# Patient Record
Sex: FEMALE | Race: BLACK OR AFRICAN AMERICAN | Employment: FULL TIME | URBAN - METROPOLITAN AREA
[De-identification: names, ages, dates, MRNs, and addresses within clinical notes are randomized per-mention and may not be internally consistent; named-entity substitution may affect disease eponyms.]

---

## 2018-05-09 ENCOUNTER — HOSPITAL ENCOUNTER (EMERGENCY)
Age: 48
Discharge: HOME | End: 2018-05-09
Attending: EMERGENCY MEDICINE
Payer: SELF-PAY

## 2018-05-09 VITALS
RESPIRATION RATE: 16 BRPM | OXYGEN SATURATION: 100 % | HEART RATE: 87 BPM | DIASTOLIC BLOOD PRESSURE: 74 MMHG | SYSTOLIC BLOOD PRESSURE: 127 MMHG | TEMPERATURE: 98.6 F

## 2018-05-09 DIAGNOSIS — J04.0 LARYNGITIS: Primary | ICD-10-CM

## 2018-05-09 PROCEDURE — 99283 EMERGENCY DEPT VISIT LOW MDM: CPT

## 2018-05-09 PROCEDURE — 74011250637 HC RX REV CODE- 250/637: Performed by: EMERGENCY MEDICINE

## 2018-05-09 PROCEDURE — 87077 CULTURE AEROBIC IDENTIFY: CPT

## 2018-05-09 PROCEDURE — 87880 STREP A ASSAY W/OPTIC: CPT

## 2018-05-09 RX ORDER — DEXAMETHASONE SODIUM PHOSPHATE 4 MG/ML
10 INJECTION, SOLUTION INTRA-ARTICULAR; INTRALESIONAL; INTRAMUSCULAR; INTRAVENOUS; SOFT TISSUE ONCE
Status: COMPLETED | OUTPATIENT
Start: 2018-05-09 | End: 2018-05-09

## 2018-05-09 RX ADMIN — DEXAMETHASONE SODIUM PHOSPHATE 10 MG: 4 INJECTION, SOLUTION INTRAMUSCULAR; INTRAVENOUS at 09:05

## 2018-05-09 NOTE — ED PROVIDER NOTES
EMERGENCY DEPARTMENT HISTORY AND PHYSICAL EXAM    7:13 AM      Date: 5/9/2018  Patient Name: Neftali Patel    History of Presenting Illness     Chief Complaint   Patient presents with    Laryngitis         History Provided By: Patient    Chief Complaint: Voice chage  Duration: 2 Days  Timing:  Acute and Progressive  Location: Throat  Quality: \"like I'm straining my voice, it's gone\"  Severity: Moderate  Modifying Factors: none reported  Associated Symptoms: postnasal drip, congestion      Additional History (Context): Neftali Patel is a 57 y.o. female with No significant past medical history who presents with progressive voice loss x2 days. Associated sx include congestion, postnasal drip. Reports not smoking for several months and had a neighbor's cigarette prior to onset of symptoms. Pt denies fever, CP, SOB, hx prior. Pt is not in a job requiring a lot of vocal strain. No hx tobacco use, occasional etOH. PCP: None        Past History     Past Medical History:  History reviewed. No pertinent past medical history. Past Surgical History:  History reviewed. No pertinent surgical history. Family History:  History reviewed. No pertinent family history. Social History:  Social History   Substance Use Topics    Smoking status: Former Smoker    Smokeless tobacco: Never Used    Alcohol use No       Allergies:  No Known Allergies      Review of Systems       Review of Systems   Constitutional: Negative for chills. HENT: Positive for congestion, postnasal drip and voice change. Negative for sore throat. Respiratory: Negative for cough and shortness of breath. Cardiovascular: Negative for chest pain. Gastrointestinal: Negative for abdominal pain, diarrhea, nausea and vomiting. Genitourinary: Negative for dysuria. Musculoskeletal: Negative for back pain. Skin: Negative for rash. Neurological: Negative for dizziness and headaches.    All other systems reviewed and are negative. Physical Exam     Visit Vitals    /74    Pulse 87    Temp 98.6 °F (37 °C)    Resp 16    SpO2 100%         Physical Exam  General Exam: Patient is a well developed and well nourished in no distress. Patient does not appear acutely ill or toxic. Eye Exam: Lids and conjunctiva are normal  ENT Exam: The general head and facial exam is normal.  The neck is supple without meningeal signs. Mild L anterior cervical lymphadenopathy. No tonsillar swelling or exudate, no oral abscess. Pulmonary Exam: No respiratory distress. The respiratory rate is normal.  No stridor. The breath sounds are equal bilaterally. There are no wheezes, rales, or rhonchi noted. Cardiac Exam: The cardiac rate and rhythm are normal.  No significant murmurs, rubs, or gallops. The peripheral pulses are normal.  Abdominal Exam: Abdomen is soft and non-distended. No pulsatile masses. There is no local tenderness. There is no rebound or guarding noted. Skin and Soft Tissue: The skin is warm and dry, without significant abnormality. Good color. Musculoskeletal Exam: No peripheral edema. The musculoskeletal exam of the lower extremities is normal without significant local tenderness. Psychiatric: Normal adult with appropriate demeanor and interpersonal interaction. Is oriented to person, place, and time. Diagnostic Study Results     Labs -  Recent Results (from the past 12 hour(s))   STREP THROAT SCREEN    Collection Time: 05/09/18  7:45 AM   Result Value Ref Range    Special Requests: NO SPECIAL REQUESTS      Strep Screen NEGATIVE       Culture result: PENDING            Medical Decision Making   I am the first provider for this patient. I reviewed the vital signs, available nursing notes, past medical history, past surgical history, family history and social history. Vital Signs-Reviewed the patient's vital signs.     Pulse Oximetry Analysis -  100% on room air (Interpretation) nonhypoxic    Cardiac Monitor:  Rate: 87        Records Reviewed: Nursing Notes (Time of Review: 7:13 AM)    ED Course: Progress Notes, Reevaluation, and Consults:      Provider Notes (Medical Decision Making): Pt with sore throat, loss of voice occurring after smoking cigarette. No signs of airway compromise. No oral/facial swelling. Vitals normal. Strep negative. Pt controlling secretions, drinking warm fluids without issue. Will give dose of decadron to help with inflammation. Doubt need for labs or imaging at this time given pt's well appearance. Pt aware of treatment plan and to return to ER with any worsening symptoms. Encougared to avoid tobacco.      Diagnosis     Clinical Impression:   1. Laryngitis        Disposition: Discharge     Follow-up Information     Follow up With Details Comments Contact [facility] EMERGENCY DEPT  If symptoms worsen: trouble swallowing, swelling in mouth [address]  [phone]           Patient's Medications    No medications on file     _______________________________    Attestations:  Scribe [address] [place] [address] acting as a scribe for and in the presence of Cheko Phillips MD      May 09, 2018 at 8:44 AM       Provider Attestation:      I personally performed the services described in the documentation, reviewed the documentation, as recorded by the scribe in my presence, and it accurately and completely records my words and actions.  May 09, 2018 at 8:44 AM - Cheko Phillips MD    _______________________________

## 2018-05-09 NOTE — LETTER
[facility] EMERGENCY DEPT 
[address] [facility] [id number] 
[phone] Work/School Note Date: 5/9/2018 To Whom It May concern: 
 
Olya Davalos was seen and treated today in the emergency room by the following provider(s): 
Attending Provider: Anny Lazo MD. Olya Davalos may return to work on 05/10/18. Sincerely, Anny Lazo MD

## 2018-05-11 LAB
B-HEM STREP THROAT QL CULT: NEGATIVE
BACTERIA SPEC CULT: ABNORMAL
BACTERIA SPEC CULT: ABNORMAL
SERVICE CMNT-IMP: ABNORMAL

## 2020-07-27 ENCOUNTER — VIRTUAL VISIT (OUTPATIENT)
Dept: FAMILY MEDICINE CLINIC | Age: 50
End: 2020-07-27

## 2020-07-27 DIAGNOSIS — Z13.220 SCREENING, LIPID: ICD-10-CM

## 2020-07-27 DIAGNOSIS — D50.0 IRON DEFICIENCY ANEMIA DUE TO CHRONIC BLOOD LOSS: ICD-10-CM

## 2020-07-27 DIAGNOSIS — Z12.31 SCREENING MAMMOGRAM, ENCOUNTER FOR: ICD-10-CM

## 2020-07-27 DIAGNOSIS — N93.8 DUB (DYSFUNCTIONAL UTERINE BLEEDING): ICD-10-CM

## 2020-07-27 DIAGNOSIS — N93.8 DUB (DYSFUNCTIONAL UTERINE BLEEDING): Primary | ICD-10-CM

## 2020-07-27 DIAGNOSIS — Z76.89 ENCOUNTER TO ESTABLISH CARE: ICD-10-CM

## 2020-07-27 RX ORDER — LANOLIN ALCOHOL/MO/W.PET/CERES
325 CREAM (GRAM) TOPICAL
COMMUNITY

## 2020-07-27 NOTE — PROGRESS NOTES
Chief Complaint   Patient presents with   [address] Patient   [facility] Follow Up     Reginia Certain 6/14/20       1. Have you been to the ER, urgent care clinic since your last visit? Hospitalized since your last visit? [facility] Hemorrhage 6/14-6-18/20    2. Have you seen or consulted any other health care providers outside of the [address] [place] [address] since your last visit? Include any pap smears or colon screening.  Obgyn

## 2020-07-27 NOTE — PROGRESS NOTES
Jose R               [address]               [phone]      Sandy Merchant is a 52 y.o. female who was seen by synchronous (real-time) audio-video technology on 7/27/2020. Consent: Carole Ireland, who was seen by synchronous (real-time) audio-video technology, and/or her healthcare decision maker, is aware that this patient-initiated, Telehealth encounter on 7/27/2020 is a billable service, with coverage as determined by her insurance carrier. She is aware that she may receive a bill and has provided verbal consent to proceed: Yes. Assessment & Plan:   Diagnoses and all orders for this visit:    1. DUB (dysfunctional uterine bleeding)  -     CBC W/O DIFF; Future  -     METABOLIC PANEL, COMPREHENSIVE; Future  -     TSH 3RD GENERATION; Future  -     T4, FREE; Future  Hospitalized for DUB of unknown cause  Hgb as low as 3.5  Follow up labs and check thyroid as possible cauase  Has GYN visit on august 3rd     2. Iron deficiency anemia due to chronic blood loss  -     CBC W/O DIFF; Future  -     METABOLIC PANEL, COMPREHENSIVE; Future  Follow up labs for her anemia    3. Screening, lipid  -     LIPID PANEL; Future  Health maintenance: lipid screening    4. Screening mammogram, encounter for  -     ESVIN MAMMO BI SCREENING INCL CAD; Future  Health maintenance: mammogram ordered    5. Encounter to establish care  Does not have a previous PCP    Follow-up and Dispositions    · Return for ASAP, Labs, AND 1 year, CPE, 30 min, office only.              712  Subjective:   Sandy Merchant is a 52 y.o. female who was seen for   New Patient and Hospital Follow Up [facility] 6/14/20)    Works as CNA, in home health    Dysfunctional uterine bleeding  In hospital from 6/11-13/20  The cause of the bleeding is unknown  US was negative  Endorses: hot flashes at night, cold intolerance  Last H/H 6/13/20: 8.3/26.4  Lowest when hospitalized was 3.5  Has appointment with gyn  Freida on 8/3/2020  Is not taking iron daily  No longer on provera: menses has stopped around 7/13/20    No history of colon cancer in her family  No history of breast cancer in her family  No history of osteoporosis    Prior to Admission medications    Medication Sig Start Date End Date Taking? Authorizing Provider   ferrous sulfate 325 mg (65 mg iron) tablet Take 325 mg by mouth Daily (before breakfast). Yes Provider, Historical     No Known Allergies    Patient Active Problem List   Diagnosis Code    DUB (dysfunctional uterine bleeding) N93.8    Iron deficiency anemia due to chronic blood loss D50.0     Patient Active Problem List    Diagnosis Date Noted    Iron deficiency anemia due to chronic blood loss 06/12/2020    DUB (dysfunctional uterine bleeding) 06/11/2020       No Known Allergies  Past Medical History:   Diagnosis Date    Hemorrhage 2020    Blood Transfusion      History reviewed. No pertinent surgical history. Family History   Problem Relation Age of Onset    Hypertension Mother      Social History     Tobacco Use    Smoking status: Former Smoker     Packs/day: 1.00     Years: 15.00     Pack years: 15.00    Smokeless tobacco: Never Used   Substance Use Topics    Alcohol use: Yes       ROS  As stated in HPI, otherwise all others negative. Objective: There were no vitals taken for this visit. General: alert, cooperative, no distress   Mental  status: normal mood, behavior, speech, dress, motor activity, and thought processes, able to follow commands   HENT: NCAT   Neck: no visualized mass   Resp: no respiratory distress   Neuro: no gross deficits   Skin: no discoloration or lesions of concern on visible areas   Psychiatric: normal affect, consistent with stated mood, no evidence of hallucinations     Additional exam findings: We discussed the expected course, resolution and complications of the diagnosis(es) in detail.   Medication risks, benefits, costs, interactions, and alternatives were discussed as indicated. I advised her to contact the office if her condition worsens, changes or fails to improve as anticipated. She expressed understanding with the diagnosis(es) and plan. Clementina Flanagan is a 52 y.o. female who was evaluated by a video visit encounter for concerns as above. Patient identification was verified prior to start of the visit. A caregiver was present when appropriate. Due to this being a TeleHealth encounter (During [facility]-27 public health emergency), evaluation of the following organ systems was limited: Vitals/Constitutional/EENT/Resp/CV/GI//MS/Neuro/Skin/Heme-Lymph-Imm. Pursuant to the emergency declaration under the [facility]5 waiver authority and the [employer] Act, this Virtual  Visit was conducted, with patient's (and/or legal guardian's) consent, to reduce the patient's risk of exposure to COVID-19 and provide necessary medical care. Services were provided through a video synchronous discussion virtually to substitute for in-person clinic visit. Patient and provider were located at their individual homes. An After Visit Summary was printed and given to the patient. All diagnosis have been discussed with the patient and all of the patient's questions have been answered. Follow-up and Dispositions    · Return for ASAP, Labs, AND 1 year, CPE, 30 min, office only. Ricci Young, [place] [facility] [id number] [address]   Enrico Sandoval

## 2020-08-07 ENCOUNTER — VIRTUAL VISIT (OUTPATIENT)
Dept: FAMILY MEDICINE CLINIC | Age: 50
End: 2020-08-07

## 2020-08-07 DIAGNOSIS — N93.8 DUB (DYSFUNCTIONAL UTERINE BLEEDING): Primary | ICD-10-CM

## 2020-08-07 NOTE — PROGRESS NOTES
Chief Complaint   Patient presents with    Other     Needs referral to OBGYN-endometrial Bx         1. Have you been to the ER, urgent care clinic since your last visit? Hospitalized since your last visit? April for bleeding- [facility]   2. Have you seen or consulted any other health care providers outside of the [address] [place] [address] since your last visit? Include any pap smears or colon screening.  Obgyn

## 2021-12-02 ENCOUNTER — OFFICE VISIT (OUTPATIENT)
Dept: FAMILY MEDICINE CLINIC | Age: 51
End: 2021-12-02
Payer: MEDICAID

## 2021-12-02 VITALS
RESPIRATION RATE: 18 BRPM | SYSTOLIC BLOOD PRESSURE: 130 MMHG | BODY MASS INDEX: 28.77 KG/M2 | HEIGHT: 66 IN | DIASTOLIC BLOOD PRESSURE: 78 MMHG | TEMPERATURE: 98.2 F | WEIGHT: 179 LBS

## 2021-12-02 DIAGNOSIS — H60.393 OTHER INFECTIVE ACUTE OTITIS EXTERNA OF BOTH EARS: Primary | ICD-10-CM

## 2021-12-02 PROCEDURE — 99213 OFFICE O/P EST LOW 20 MIN: CPT | Performed by: NURSE PRACTITIONER

## 2021-12-02 RX ORDER — NEOMYCIN SULFATE, POLYMYXIN B SULFATE AND HYDROCORTISONE 10; 3.5; 1 MG/ML; MG/ML; [USP'U]/ML
3 SUSPENSION/ DROPS AURICULAR (OTIC) 4 TIMES DAILY
Qty: 10 ML | Refills: 0 | Status: SHIPPED | OUTPATIENT
Start: 2021-12-02 | End: 2021-12-12

## 2021-12-02 NOTE — PROGRESS NOTES
[facility], [address]      Emiliano Borja is a 54 y.o. female and presents with Ear Pain (patient states popping nosie in right ear )       Assessment/Plan:    Diagnoses and all orders for this visit:    1. Other infective acute otitis externa of both ears  -     neomycin-polymyxin-hydrocortisone, buffered, (PEDIOTIC) 3.5-10,000-1 mg/mL-unit/mL-% otic suspension; Administer 3 Drops into each ear four (4) times daily for 10 days. Follow-up and Dispositions    · Return in about 7 months (around 7/2/2022) for CPE, w/o gyn, 30 min, office only. Health Maintenance:   Health Maintenance   Topic Date Due    Hepatitis C Screening  Never done    DTaP/Tdap/Td series (1 - Tdap) Never done    Cervical cancer screen  Never done    Lipid Screen  Never done    Colorectal Cancer Screening Combo  Never done    Flu Vaccine (1) Never done    COVID-19 Vaccine  Completed    Pneumococcal 0-64 years  Aged Out        Subjective:    Onset  2 weeks ago  Intermittent popping and decreased hearing to right ear  Denies use of ear buds, headphones, swimming  Had not tried anything for the problem    ROS:     ROS  As stated in HPI, otherwise all others negative. The problem list was updated as a part of today's visit. Patient Active Problem List   Diagnosis Code    DUB (dysfunctional uterine bleeding) N93.8    Iron deficiency anemia due to chronic blood loss D50.0       The PSH, FH were reviewed. SH:  Social History     Tobacco Use    Smoking status: Former Smoker     Packs/day: 1.00     Years: 15.00     Pack years: 15.00    Smokeless tobacco: Never Used   Substance Use Topics    Alcohol use: Yes    Drug use: No       Medications/Allergies:  Current Outpatient Medications on File Prior to Visit   Medication Sig Dispense Refill    ferrous sulfate 325 mg (65 mg iron) tablet Take 325 mg by mouth Daily (before breakfast).        No current facility-administered medications on file prior to visit. No Known Allergies    Objective:  Visit Vitals  /78 (BP 1 Location: Left arm, BP Patient Position: Sitting, BP Cuff Size: Adult)   Temp 98.2 °F (36.8 °C) (Temporal)   Resp 18   Ht 5' 6\" (1.676 m)   Wt 179 lb (81.2 kg)   BMI 28.89 kg/m²    Body mass index is 28.89 kg/m². Physical assessment  Physical Exam  HENT:      Right Ear: Hearing and external ear normal. Swelling and tenderness present. A middle ear effusion is present. Left Ear: Hearing and external ear normal. Swelling and tenderness present. A middle ear effusion is present. Labwork and Ancillary Studies:    CBC w/Diff  No results found for: WBC, WBCLT, HGB, HGBP, PLT, HGBEXT, PLTEXT, HGBEXT, PLTEXT      Basic Metabolic Profile  No results found for: NA, K, CL, CO2, AGAP, GLU, BUN, CREA, BUCR, GFRAA, GFRNA, CA, GFRAA     Cholesterol  No results found for: CHOL, CHOLX, CHLST, CHOLV, HDL, HDLP, LDL, LDLC, DLDLP, TGLX, TRIGL, TRIGP, CHHD, CHHDX        I have discussed the diagnosis with the patient and the intended plan as seen in the above orders. The patient has received an After-Visit Summary and questions were answered concerning future plans. An After Visit Summary was printed and given to the patient. All diagnosis have been discussed with the patient and all of the patient's questions have been answered. Follow-up and Dispositions    · Return in about 7 months (around 7/2/2022) for CPE, w/o gyn, 30 min, office only. No Artis, AGNP-BC  [facility] [place] [place]   [address] [facility] [address]  [address]

## 2021-12-02 NOTE — PROGRESS NOTES
Room 7    Did patient bring someone? No    Did the patient have DME equipment? No    Did you take your medication today? Patient dose not take medication       1. Have you been to the ER, urgent care clinic since your last visit? Hospitalized since your last visit? Yes Iris Gutierrez for head-aches     2. Have you seen or consulted any other health care providers outside of the [address] [place] [address] since your last visit? Include any pap smears or colon screening.  No    3 most recent PHQ Screens 12/2/2021   Little interest or pleasure in doing things Not at all   Feeling down, depressed, irritable, or hopeless Not at all   Total Score PHQ 2 0         Health Maintenance Due   Topic Date Due    Hepatitis C Screening  Never done    DTaP/Tdap/Td series (1 - Tdap) Never done    Cervical cancer screen  Never done    Lipid Screen  Never done    Colorectal Cancer Screening Combo  Never done    Flu Vaccine (1) Never done       Learning Assessment 7/27/2020   PRIMARY LEARNER Patient   HIGHEST LEVEL OF EDUCATION - PRIMARY LEARNER  GRADUATED HIGH SCHOOL OR GED   BARRIERS PRIMARY LEARNER NONE   CO-LEARNER CAREGIVER No   PRIMARY LANGUAGE ENGLISH   LEARNER PREFERENCE PRIMARY LISTENING   ANSWERED BY Reilly Martin   RELATIONSHIP SELF

## 2022-07-01 ENCOUNTER — OFFICE VISIT (OUTPATIENT)
Dept: FAMILY MEDICINE CLINIC | Age: 52
End: 2022-07-01
Payer: COMMERCIAL

## 2022-07-01 VITALS
DIASTOLIC BLOOD PRESSURE: 80 MMHG | SYSTOLIC BLOOD PRESSURE: 122 MMHG | TEMPERATURE: 98.2 F | OXYGEN SATURATION: 99 % | HEART RATE: 83 BPM | HEIGHT: 66 IN | BODY MASS INDEX: 28.22 KG/M2 | WEIGHT: 175.6 LBS | RESPIRATION RATE: 18 BRPM

## 2022-07-01 DIAGNOSIS — R19.06 EPIGASTRIC MASS: ICD-10-CM

## 2022-07-01 DIAGNOSIS — Z12.11 SCREEN FOR COLON CANCER: ICD-10-CM

## 2022-07-01 DIAGNOSIS — Z00.00 WELL WOMAN EXAM (NO GYNECOLOGICAL EXAM): Primary | ICD-10-CM

## 2022-07-01 DIAGNOSIS — Z12.31 ENCOUNTER FOR SCREENING MAMMOGRAM FOR MALIGNANT NEOPLASM OF BREAST: ICD-10-CM

## 2022-07-01 DIAGNOSIS — D50.0 IRON DEFICIENCY ANEMIA DUE TO CHRONIC BLOOD LOSS: ICD-10-CM

## 2022-07-01 DIAGNOSIS — Z13.220 LIPID SCREENING: ICD-10-CM

## 2022-07-01 PROCEDURE — 99396 PREV VISIT EST AGE 40-64: CPT | Performed by: NURSE PRACTITIONER

## 2022-07-01 NOTE — PROGRESS NOTES
[address]               [phone]  Subjective:   50 y.o. female for Well Woman Check. Her gyne and breast care is done elsewhere by her Ob-Gyne physician. Last visit 6/4/22. Patient Active Problem List   Diagnosis Code    DUB (dysfunctional uterine bleeding) N93.8    Iron deficiency anemia due to chronic blood loss D50.0     Current Outpatient Medications   Medication Sig Dispense Refill    ferrous sulfate 325 mg (65 mg iron) tablet Take 325 mg by mouth Daily (before breakfast). No Known Allergies  Past Medical History:   Diagnosis Date    Hemorrhage 2020    Blood Transfusion      History reviewed. No pertinent surgical history. Family History   Problem Relation Age of Onset    Hypertension Mother      Social History     Tobacco Use    Smoking status: Former Smoker     Packs/day: 1.00     Years: 15.00     Pack years: 15.00    Smokeless tobacco: Never Used   Substance Use Topics    Alcohol use: Yes        No results found for: WBC, WBCT, WBCPOC, HGB, HGBPOC, HCT, HCTPOC, PLT, PLTPOC, MCV, MCVPOC, HGBEXT, HCTEXT, PLTEXT, HGBEXT, HCTEXT, PLTEXT  No results found for: NA, K, CL, CO2, AGAP, GLU, BUN, CREA, BUCR, GFRAA, GFRNA, CA, TBIL, TBILI, ALT, AP, TP, ALB, GLOB, AGRAT      Specific concerns today:   Possible hernia on abdomen. Onset: a year ago  Location: mid upper abdomen  Characteristics: after first noticing the problem the mass went down but now seems to be getting bigger  Some pain when eating or coughing  Has been having heartburn for about two years    Review of Systems  Review of Systems   Constitutional: Negative. HENT: Negative. Eyes: Negative. Respiratory: Negative. Cardiovascular: Negative. Gastrointestinal: Positive for heartburn and melena.         Heartburn: started about 2 years ago  Taking nothing for relief  Having chronic black stools: is on iron daily   Genitourinary:        Abnormal uterine bleeding, could be menopausal, seeing GYN   Musculoskeletal: Negative. Skin: Negative. Neurological: Negative. Psychiatric/Behavioral: Negative. Objective:   Blood pressure 122/80, pulse 83, temperature 98.2 °F (36.8 °C), temperature source Temporal, resp. rate 18, height 5' 6\" (1.676 m), weight 175 lb 9.6 oz (79.7 kg), SpO2 99 %. Physical Examination:   Physical Exam  Vitals and nursing note reviewed. Constitutional:       Appearance: Normal appearance. HENT:      Head: Normocephalic and atraumatic. Right Ear: Hearing, tympanic membrane, ear canal and external ear normal.      Left Ear: Hearing, tympanic membrane, ear canal and external ear normal.      Nose: Nose normal.      Mouth/Throat:      Pharynx: Uvula midline. Eyes:      General: Lids are normal.      Conjunctiva/sclera: Conjunctivae normal.      Pupils: Pupils are equal, round, and reactive to light. Neck:      Thyroid: No thyroid mass or thyromegaly. Vascular: No carotid bruit or JVD. Trachea: Trachea normal. No tracheal deviation. Cardiovascular:      Rate and Rhythm: Normal rate and regular rhythm. Heart sounds: Normal heart sounds, S1 normal and S2 normal.   Pulmonary:      Effort: Pulmonary effort is normal.      Breath sounds: Normal breath sounds. Abdominal:      General: Bowel sounds are normal.      Palpations: Abdomen is soft. Tenderness: There is no abdominal tenderness. Hernia: A hernia is present. Musculoskeletal:         General: Normal range of motion. Cervical back: Normal range of motion and neck supple. Lymphadenopathy:      Head:      Right side of head: No submental, submandibular, tonsillar, preauricular, posterior auricular or occipital adenopathy. Left side of head: No submental, submandibular, tonsillar, preauricular, posterior auricular or occipital adenopathy. Cervical: No cervical adenopathy. Skin:     General: Skin is warm and dry.    Neurological: Mental Status: She is alert and oriented to person, place, and time. Motor: Motor function is intact. Gait: Gait is intact. Psychiatric:         Mood and Affect: Mood and affect normal.         Cognition and Memory: Memory normal.         Judgment: Judgment normal.           Assessment/Plan:     increase physical activity, limit alcohol consumption, follow low fat diet, follow low salt diet, routine labs ordered, call if any problems  Diagnoses and all orders for this visit:    1. Well woman exam (no gynecological exam)  Comments:  [V70.0]  Orders:  -     METABOLIC PANEL, COMPREHENSIVE; Future  Completed today, findings as documented  Labs to check hepatic and renal function ordered  2. Epigastric mass  -     CT ABD WO CONT; Future  -     REFERRAL TO SURGERY  Most likely has a hernia  Will obtain a ct abd and refer to surgery for further evalustion  3. Iron deficiency anemia due to chronic blood loss  -     CBC W/O DIFF; Future  Follow up labs today  She endorses taking her iron daily, states this is causing her to have black stools  4. Screen for colon cancer  -     REFERRAL FOR COLONOSCOPY  Health maintenance need addressed  5. Encounter for screening mammogram for malignant neoplasm of breast  -     ESVIN MAMMO BI SCREENING INCL CAD; Future  Health maintenance need addressed  6. Lipid screening  -     LIPID PANEL; Future  Health maintenance need addressed    Follow-up and Dispositions    · Return in about 1 year (around 7/1/2023) for CPE, w/o gyn, 30 min, office only. MITZI [facility]  [address] [place].   Enrico Sandoval

## 2022-07-01 NOTE — PROGRESS NOTES
Room 8      When asked if patient has any concerns he would like to address with ZARI Mueller patient states yes, I have a lump on the upper center of abdomin. Did patient bring someone? No    Did the patient have DME equipment? No     Did you take your medication today? Patient states I do not  take any mediication      1. \"Have you been to the ER, urgent care clinic since your last visit? Hospitalized since your last visit? \" No    2. \"Have you seen or consulted any other health care providers outside of the [address] [place] [address] since your last visit? \" No     3. For patients aged 39-70: Has the patient had a colonoscopy / FIT/ Cologuard? No Order has been placed      If the patient is female:    4. For patients aged 41-77: Has the patient had a mammogram within the past 2 years? No Order has been placed       5. For patients aged 21-65: Has the patient had a pap smear?  Yes - no Care Gap present        3 most recent PHQ Screens 7/1/2022   Little interest or pleasure in doing things Not at all   Feeling down, depressed, irritable, or hopeless Not at all   Total Score PHQ 2 0         Health Maintenance Due   Topic Date Due    Hepatitis C Screening  Never done    DTaP/Tdap/Td series (1 - Tdap) Never done    Lipid Screen  Never done    Colorectal Cancer Screening Combo  Never done    COVID-19 Vaccine (3 - Booster) 02/12/2022    Shingrix Vaccine Age 50> (1 of 2) Never done    Breast Cancer Screen Mammogram  Never done       Learning Assessment 7/27/2020   PRIMARY LEARNER Patient   HIGHEST LEVEL OF EDUCATION - PRIMARY LEARNER  GRADUATED HIGH SCHOOL OR GED   BARRIERS PRIMARY LEARNER NONE   CO-LEARNER CAREGIVER No   PRIMARY LANGUAGE ENGLISH   LEARNER PREFERENCE PRIMARY LISTENING   ANSWERED BY Baron Beatty   RELATIONSHIP SELF

## 2022-07-01 NOTE — PATIENT INSTRUCTIONS
Learning About the [address] Diet  What is the Mediterranean diet? The Mediterranean diet is a style of eating rather than a diet plan. It features foods eaten in [place], [place], [place] and [place], and other countries along the [facility]. It emphasizes eating foods like fish, fruits, vegetables, beans, high-fiber breads and whole grains, nuts, and olive oil. This style of eating includes limited red meat, cheese, and sweets. Why choose the Mediterranean diet? A Mediterranean-style diet may improve heart health. It contains more fat than other heart-healthy diets. But the fats are mainly from nuts, unsaturated oils (such as fish oils and olive oil), and certain nut or seed oils (such as canola, soybean, or flaxseed oil). These fats may help protect the heart and blood vessels. How can you get started on the Mediterranean diet? Here are some things you can do to switch to a more Mediterranean way of eating. What to eat  · Eat a variety of fruits and vegetables each day, such as grapes, blueberries, tomatoes, broccoli, peppers, figs, olives, spinach, eggplant, beans, lentils, and chickpeas. · Eat a variety of whole-grain foods each day, such as oats, brown rice, and whole wheat bread, pasta, and couscous. · Eat fish at least 2 times a week. Try tuna, salmon, mackerel, lake trout, herring, or sardines. · Eat moderate amounts of low-fat dairy products, such as milk, cheese, or yogurt. · Eat moderate amounts of poultry and eggs. · Choose healthy (unsaturated) fats, such as nuts, olive oil, and certain nut or seed oils like canola, soybean, and flaxseed. · Limit unhealthy (saturated) fats, such as butter, palm oil, and coconut oil. And limit fats found in animal products, such as meat and dairy products made with whole milk. Try to eat red meat only a few times a month in very small amounts. · Limit sweets and desserts to only a few times a week.  This includes sugar-sweetened drinks like soda.  The Mediterranean diet may also include red wine with your meal--1 glass each day for women and up to 2 glasses a day for men. Tips for eating at home  · Use herbs, spices, garlic, lemon zest, and citrus juice instead of salt to add flavor to foods. · Add avocado slices to your sandwich instead of mancera. · Have fish for lunch or dinner instead of red meat. Brush the fish with olive oil, and broil or grill it. · Sprinkle your salad with seeds or nuts instead of cheese. · Cook with olive or canola oil instead of butter or oils that are high in saturated fat. · Switch from 2% milk or whole milk to 1% or fat-free milk. · Dip raw vegetables in a vinaigrette dressing or hummus instead of dips made from mayonnaise or sour cream.  · Have a piece of fruit for dessert instead of a piece of cake. Try baked apples, or have some dried fruit. Tips for eating out  · Try broiled, grilled, baked, or poached fish instead of having it fried or breaded. · Ask your  to have your meals prepared with olive oil instead of butter. · Order dishes made with marinara sauce or sauces made from olive oil. Avoid sauces made from cream or mayonnaise. · Choose whole-grain breads, whole wheat pasta and pizza crust, brown rice, beans, and lentils. · Cut back on butter or margarine on bread. Instead, you can dip your bread in a small amount of olive oil. · Ask for a side salad or grilled vegetables instead of french fries or chips. Where can you learn more? Go to http://www.henderson.com/  Enter O407 in the search box to learn more about \"Learning About the Mediterranean Diet. \"  Current as of: August 22, 2019               Content Version: 12.6  © 0169-9565 [employer]. Care instructions adapted under license by [employer] (which disclaims liability or warranty for this information).  If you have questions about a medical condition or this instruction, always ask your healthcare professional. [address] any warranty or liability for your use of this information.

## 2022-07-02 LAB
ALBUMIN SERPL-MCNC: 4.5 G/DL (ref 3.8–4.8)
ALBUMIN/GLOB SERPL: 1.6 {RATIO} (ref 1.2–2.2)
ALP SERPL-CCNC: 75 IU/L (ref 44–121)
ALT SERPL-CCNC: 7 IU/L (ref 0–32)
AST SERPL-CCNC: 11 IU/L (ref 0–40)
BILIRUB SERPL-MCNC: 1 MG/DL (ref 0–1.2)
BUN SERPL-MCNC: 8 MG/DL (ref 6–24)
BUN/CREAT SERPL: 8 (ref 9–23)
CALCIUM SERPL-MCNC: 9.5 MG/DL (ref 8.7–10.2)
CHLORIDE SERPL-SCNC: 104 MMOL/L (ref 96–106)
CHOLEST SERPL-MCNC: 182 MG/DL (ref 100–199)
CO2 SERPL-SCNC: 23 MMOL/L (ref 20–29)
CREAT SERPL-MCNC: 1.03 MG/DL (ref 0.57–1)
EGFR: 66 ML/MIN/1.73
ERYTHROCYTE [DISTWIDTH] IN BLOOD BY AUTOMATED COUNT: 13.1 % (ref 11.7–15.4)
GLOBULIN SER CALC-MCNC: 2.8 G/DL (ref 1.5–4.5)
GLUCOSE SERPL-MCNC: 97 MG/DL (ref 65–99)
HCT VFR BLD AUTO: 40.2 % (ref 34–46.6)
HDLC SERPL-MCNC: 41 MG/DL
HGB BLD-MCNC: 13.3 G/DL (ref 11.1–15.9)
IMP & REVIEW OF LAB RESULTS: NORMAL
LDLC SERPL CALC-MCNC: 120 MG/DL (ref 0–99)
MCH RBC QN AUTO: 31 PG (ref 26.6–33)
MCHC RBC AUTO-ENTMCNC: 33.1 G/DL (ref 31.5–35.7)
MCV RBC AUTO: 94 FL (ref 79–97)
PLATELET # BLD AUTO: 264 X10E3/UL (ref 150–450)
POTASSIUM SERPL-SCNC: 4.3 MMOL/L (ref 3.5–5.2)
PROT SERPL-MCNC: 7.3 G/DL (ref 6–8.5)
RBC # BLD AUTO: 4.29 X10E6/UL (ref 3.77–5.28)
SODIUM SERPL-SCNC: 139 MMOL/L (ref 134–144)
TRIGL SERPL-MCNC: 115 MG/DL (ref 0–149)
VLDLC SERPL CALC-MCNC: 21 MG/DL (ref 5–40)
WBC # BLD AUTO: 7.8 X10E3/UL (ref 3.4–10.8)

## 2022-07-12 ENCOUNTER — OFFICE VISIT (OUTPATIENT)
Dept: SURGERY | Age: 52
End: 2022-07-12
Payer: COMMERCIAL

## 2022-07-12 VITALS
SYSTOLIC BLOOD PRESSURE: 122 MMHG | HEIGHT: 66 IN | WEIGHT: 174 LBS | DIASTOLIC BLOOD PRESSURE: 73 MMHG | HEART RATE: 86 BPM | TEMPERATURE: 97.9 F | OXYGEN SATURATION: 97 % | BODY MASS INDEX: 27.97 KG/M2

## 2022-07-12 DIAGNOSIS — R22.2 ABDOMINAL WALL MASS: ICD-10-CM

## 2022-07-12 DIAGNOSIS — K43.9 VENTRAL HERNIA WITHOUT OBSTRUCTION OR GANGRENE: Primary | ICD-10-CM

## 2022-07-12 PROCEDURE — 99204 OFFICE O/P NEW MOD 45 MIN: CPT | Performed by: SURGERY

## 2022-07-12 RX ORDER — BISMUTH SUBSALICYLATE 262 MG
1 TABLET,CHEWABLE ORAL DAILY
COMMUNITY

## 2022-07-12 NOTE — PROGRESS NOTES
Rocael Freitas is a 50 y.o. female (: 1970) presenting to address:    Chief Complaint   Patient presents with    New Patient     Epigastric mass x couple of mionths/ referred by Abdias Jacinto NP       Medication list and allergies have been reviewed with Sandy Sandhu and updated as of today's date. I have gone over all Medical, Surgical and Social History with Sandy Sandhu and updated/added the information accordingly.

## 2022-07-12 NOTE — PROGRESS NOTES
General Surgery Consult      Sandy James  Admit date: (Not on file)    MRN: [id number]     : 1970     Age: 50 y.o. Attending Physician: Stacey Antoine MD, [facility]      History of Present Illness:     Sandy James is a 50 y.o. female was referred to me by Kina Willett for evaluation of a large abdominal wall mass. The patient has stated that the mass has been there for about 1 year now but it has been increasing in size extremely in the last couple months. She stated that she has some pain and discomfort at the site of the bulge especially with movement. She denies any nausea or vomiting or any change in bowel habits. She denies any previous abdominal surgeries. A CT scan of abdomen and pelvis was already ordered but its not done yet. Patient Active Problem List    Diagnosis Date Noted    Iron deficiency anemia due to chronic blood loss 2020    DUB (dysfunctional uterine bleeding) 2020     Past Medical History:   Diagnosis Date    Hemorrhage     Blood Transfusion       History reviewed. No pertinent surgical history. Social History     Tobacco Use    Smoking status: Former Smoker     Packs/day: 1.00     Years: 15.00     Pack years: 15.00    Smokeless tobacco: Never Used   Substance Use Topics    Alcohol use: Yes     Comment: wine/occ      Social History     Tobacco Use   Smoking Status Former Smoker    Packs/day: 1.00    Years: 15.00    Pack years: 15.00   Smokeless Tobacco Never Used     Family History   Problem Relation Age of Onset    Hypertension Mother       Current Outpatient Medications   Medication Sig    multivitamin (ONE A DAY) tablet Take 1 Tablet by mouth daily.  ferrous sulfate 325 mg (65 mg iron) tablet Take 325 mg by mouth Daily (before breakfast). No current facility-administered medications for this visit.       No Known Allergies     Review of Systems:  Constitutional: negative  Eyes: negative  Ears, Nose, Mouth, Throat, and Face: negative  Respiratory: negative  Cardiovascular: negative  Gastrointestinal: positive for abdominal pain  Genitourinary:negative  Integument/Breast: negative  Hematologic/Lymphatic: negative  Musculoskeletal:negative  Neurological: negative  Behavioral/Psychiatric: negative  Endocrine: negative  Allergic/Immunologic: negative    Objective:     Visit Vitals  /73 (BP 1 Location: Right arm, BP Patient Position: Sitting, BP Cuff Size: Small adult)   Pulse 86   Temp 97.9 °F (36.6 °C) (Temporal)   Ht 5' 6\" (1.676 m)   Wt 78.9 kg (174 lb)   SpO2 97%   BMI 28.08 kg/m²       Physical Exam:      General:  in no apparent distress, alert, oriented times 3, afebrile and normal vitals   Eyes:  conjunctivae and sclerae normal, pupils equal, round, reactive to light   Throat & Neck: no erythema or exudates noted and neck supple and symmetrical; no palpable masses   Lungs:   clear to auscultation bilaterally   Heart:  Regular rate and rhythm   Abdomen:   rounded, soft, nontender, nondistended, no organomegaly. There is a large bulge in the upper abdomen between the umbilicus and the epigastric area. On examination it feels like a relatively large abdominal wall hernia but also it could be a very large lipoma.     Extremities: extremities normal, atraumatic, no cyanosis or edema   Skin: Normal.       Imaging and Lab Review:     CBC:   Lab Results   Component Value Date/Time    WBC 7.8 07/01/2022 09:44 AM    RBC 4.29 07/01/2022 09:44 AM    HGB 13.3 07/01/2022 09:44 AM    HCT 40.2 07/01/2022 09:44 AM    PLATELET 267 52/41/3551 09:44 AM     BMP:   Lab Results   Component Value Date/Time    Glucose 97 07/01/2022 09:44 AM    Sodium 139 07/01/2022 09:44 AM    Potassium 4.3 07/01/2022 09:44 AM    Chloride 104 07/01/2022 09:44 AM    CO2 23 07/01/2022 09:44 AM    BUN 8 07/01/2022 09:44 AM    Creatinine 1.03 (H) 07/01/2022 09:44 AM    Calcium 9.5 07/01/2022 09:44 AM     CMP:  Lab Results   Component Value Date/Time    Glucose 97 07/01/2022 09:44 AM    Sodium 139 07/01/2022 09:44 AM    Potassium 4.3 07/01/2022 09:44 AM    Chloride 104 07/01/2022 09:44 AM    CO2 23 07/01/2022 09:44 AM    BUN 8 07/01/2022 09:44 AM    Creatinine 1.03 (H) 07/01/2022 09:44 AM    Calcium 9.5 07/01/2022 09:44 AM    BUN/Creatinine ratio 8 (L) 07/01/2022 09:44 AM    Alk. phosphatase 75 07/01/2022 09:44 AM    Protein, total 7.3 07/01/2022 09:44 AM    Albumin 4.5 07/01/2022 09:44 AM    A-G Ratio 1.6 07/01/2022 09:44 AM       No results found for this or any previous visit (from the past 24 hour(s)). images and reports reviewed    Assessment:   Emiliano Borja is a 50 y.o. female who is presenting with a very large abdominal mass located in the upper abdomen most likely representing a large ventral hernia with incarcerated content. However it also could be an abdominal wall mass like a lipoma or other pathology and I do agree that the patient needs to get a CT scan of abdomen and pelvis. There has been already an order placed but is not done yet and the patient is got a call today to schedule it. I did discuss with the patient that in case his CT scan showed evidence of incarcerated ventral hernia, I discussed the possibility of incarceration, strangulation, enlargement in size over time, and the risk of emergency surgery in the face of strangulation. I also discussed the use of prosthetic materials (mesh), including the risk of infection. Also discussed the risk of surgery including recurrence and the possible need for reoperation and removal of mesh if used, possibility of postoperative small bowel injury, obstruction or ileus, and the risks of general anesthetic. I explained to the the patient about the robotic hernia repair procedure.      Plan:     Await for the CT scan of abdomen and pelvis to be done  Follow-up with me after the results    Please call me if you have any questions (cell phone: [phone])     Signed By: Alfredo Herrera MD     July 12, 2022

## 2022-07-20 ENCOUNTER — HOSPITAL ENCOUNTER (OUTPATIENT)
Dept: CT IMAGING | Age: 52
Discharge: HOME | End: 2022-07-20
Attending: NURSE PRACTITIONER
Payer: COMMERCIAL

## 2022-07-20 DIAGNOSIS — R19.06 EPIGASTRIC MASS: ICD-10-CM

## 2022-07-20 PROCEDURE — 74150 CT ABDOMEN W/O CONTRAST: CPT
